# Patient Record
Sex: FEMALE | URBAN - METROPOLITAN AREA
[De-identification: names, ages, dates, MRNs, and addresses within clinical notes are randomized per-mention and may not be internally consistent; named-entity substitution may affect disease eponyms.]

---

## 2021-10-14 ENCOUNTER — OFFICE VISIT (OUTPATIENT)
Dept: ORTHOPEDIC SURGERY | Age: 46
End: 2021-10-14
Payer: COMMERCIAL

## 2021-10-14 DIAGNOSIS — M70.62 GREATER TROCHANTERIC BURSITIS OF BOTH HIPS: ICD-10-CM

## 2021-10-14 DIAGNOSIS — G89.29 CHRONIC BILATERAL LOW BACK PAIN WITHOUT SCIATICA: Primary | ICD-10-CM

## 2021-10-14 DIAGNOSIS — M54.50 CHRONIC BILATERAL LOW BACK PAIN WITHOUT SCIATICA: Primary | ICD-10-CM

## 2021-10-14 DIAGNOSIS — M70.61 GREATER TROCHANTERIC BURSITIS OF BOTH HIPS: ICD-10-CM

## 2021-10-14 PROCEDURE — 99204 OFFICE O/P NEW MOD 45 MIN: CPT | Performed by: PHYSICAL MEDICINE & REHABILITATION

## 2021-10-14 RX ORDER — LEVOTHYROXINE SODIUM 150 UG/1
150 TABLET ORAL
COMMUNITY

## 2021-10-14 RX ORDER — OMEPRAZOLE 20 MG/1
20 CAPSULE, DELAYED RELEASE ORAL DAILY
COMMUNITY

## 2021-10-14 RX ORDER — MELOXICAM 15 MG/1
15 TABLET ORAL
Qty: 30 TABLET | Refills: 0 | Status: SHIPPED | OUTPATIENT
Start: 2021-10-14 | End: 2021-11-13

## 2021-10-14 RX ORDER — METOPROLOL SUCCINATE 50 MG/1
50 TABLET, EXTENDED RELEASE ORAL DAILY
COMMUNITY

## 2021-10-14 NOTE — PROGRESS NOTES
[address]  [address]  [place], [address] [place] [address] Street  Phone: [phone]  Fax: [phone]      Patient: Vincent Celis                                                                              MRN: [id number]        YOB: 1975          AGE: 54 y.o. PCP: Noris Bran MD  Date:  10/14/21    Reason for Consultation: Back Pain      HPI:  Vincent Celis is a 54 y.o. female with relevant PMH of  HTN, GERD  who presents with low back pain radiating down the right leg . Pain began at the age of 11 when she was horseback riding and thrown from a horse. She did not have any imaging or treatment at the time. Three years ago she moved here from [place]. While in [place] she tried an epidural injection, PT and chiropractic treatments. Recently she was seen at [facility] and had x-rays of her lumbar spine with mild DDD at L5-S1. She was referred to PT but was not able to go at that time. Neurologic symptoms: + numbness, tingling- b/l hands. NO weakness, bowel or bladder changes. No recent falls    Numbness b/l hands    Location: The pain is located in the low back , upper back    Radiation: The pain does radiate poterior thigh, intermittent. Pain Score: Currently: 4/10  At Best: 2/10  At Worst: 9/10   Quality: Pain is of a Achy, Dull and sharp quality. Aggravating: Pain is exacerbated by walking and standing  Alleviating:  The pain is alleviated by sitting and lying down    Prior Treatments:   Physical therapy in [place] - traction -made pain worse 2007  Epidural DRISS 2006 3 months relief     Previous Medications:   Current Medications: ibuprofen 800mg    Previous work-up has included:   X-ray lumbar 7/2021  Lumbar spine shows overall normal coronal and sagittal alignment.  Overall   well-maintained disc spaces without significant degenerative disc disease   present except for mild degenerative changes at the L5-S1 level.  Trace   anterolisthesis of L5 on S1.  X-ray thoracic spine 7/2021  Thoracic spine shows mild degenerative disc disease at multiple levels   without significant evidence disc collapse.  No evidence of acute fracture   or dislocation.  Overall normal sagittal and coronal alignment  VERTEBRAE AND ALIGNMENT: Segments are normal in height and alignment. T9 levels and below visualized on the AP view in the mid T7 level below as seen on the lateral view. Past Medical History: No past medical history on file. Past Surgical History: No past surgical history on file. SocHx:   Social History     Tobacco Use    Smoking status: Not on file   Substance Use Topics    Alcohol use: Not on file      FamHx:? No family history on file. Current Medications:    Current Outpatient Medications   Medication Sig Dispense Refill    metoprolol succinate (TOPROL-XL) 50 mg XL tablet Take 50 mg by mouth daily.  levothyroxine (SYNTHROID) 150 mcg tablet Take 150 mcg by mouth Daily (before breakfast).  omeprazole (PRILOSEC) 20 mg capsule Take 20 mg by mouth daily. Allergies: Allergies   Allergen Reactions    Latex Hives    Ambien [Zolpidem] Hives    Iron Hives    Propulsid Hives    Wellbutrin [Bupropion] Hives        Review of Systems:   Gen:    Denied fevers, chills, malaise, fatigue, weight changes   Resp: Denied shortness of breath, cough, wheezing   CVS: Denied chest pain, palpitations   : Denied urinary urgency, frequency, incontinence   GI: Denied nausea, vomiting, constipation, diarrhea   Skin: Denied rashes, wounds   Psych: Denied anxiety, depression   Vasc: Denied claudication, ulcers   Hem: Denied easy bruising/bleeding   MSK: See HPI   Neuro: See HPI         Physical Exam     Vital Signs: There were no vitals taken for this visit. General: ???????  Well nourished and well developed female without any acute distress   Psychiatric: ?  Alert and oriented x 3 with normal mood    HEENT: ???????? Atraumatic   Respiratory:   Breathing non-labored and non dyspneic   CV: ???????????????? Peripheral pulses intact, no peripheral edema   Skin: ????????????? No rashes       Neurologic: ?? Sensation: normal and grossly intact thebilateral, lower extremity(s)   Strength: 5/5 in the bilateral, lower extremity(s)   Reflexes: reveals 2+ symmetric DTRs throughout    Gait: normal       Musculoskeletal: Lumbar Exam     Inspection:   Alignment: Normal b/l genu valgum  Atrophy: None   Single leg stance: Abnormal instability      Tenderness to Palpation:   Lumbar paraspinals Positive L2- SI joints  Lumbar spinous processes Positive  SI Joint:  Positive  Gluteal:Positive   Greater trochanter: Positive     ROM:   Lumbar ROM: Normal  Lumbar facet loading: Negative  Hip ROM: No reproduction of pain with movement     Special Tests      Slump test: Negative  SLR: Negative  MONICA: Positive  Right low back pain  FADIR: Negative  Scour:  Negative  Stinchfield: Negative++ low back pain  Log Roll: Negative  SI joint compression: Negative        Medical Decision Making:    Images: The imaging results as well as the actual images of the studies below were reviewed and visualized. Labs: The results below were reviewed. X-ray lumbar 7/2021  Lumbar spine shows overall normal coronal and sagittal alignment.  Overall   well-maintained disc spaces without significant degenerative disc disease   present except for mild degenerative changes at the L5-S1 level.  Trace   anterolisthesis of L5 on S1.  X-ray thoracic spine 7/2021  Thoracic spine shows mild degenerative disc disease at multiple levels   without significant evidence disc collapse.  No evidence of acute fracture   or dislocation.  Overall normal sagittal and coronal alignment       Assessment:   1. Low back pain- DDD  2. SI joint pain  3.  GT bursitis with gluteal weakness    Plan:      -Physical therapy -encouraged to start PT to work on gluteal strengthening exercises     -Medications - will try short course of meloxicam 15mg with food x 10 days, not to take with other NSAIDS and can then take prnCounseled regarding side effects and appropriate administration of medications.    -Diagnostics/Imaging - reviewed x-ray reports  -Injections - Consider DRISS, vs SI joint injections  -Lifestyle - She has recently lost weight, continue weight loss program, begin regular aerobic and strengthening exercise  -Education - The patient's diagnosis, prognosis and treatment options were discussed today. All questions were answered. F/U - in 8 week(s) or sooner if needed.   Consider MRI lumbar spine         [facility]

## 2021-10-20 ENCOUNTER — HOSPITAL ENCOUNTER (OUTPATIENT)
Dept: PHYSICAL THERAPY | Age: 46
Discharge: HOME | End: 2021-10-20
Attending: PHYSICAL MEDICINE & REHABILITATION
Payer: MEDICARE

## 2021-10-20 PROCEDURE — 97162 PT EVAL MOD COMPLEX 30 MIN: CPT

## 2021-10-20 PROCEDURE — 97110 THERAPEUTIC EXERCISES: CPT

## 2021-10-20 NOTE — PROGRESS NOTES
PHYSICAL THERAPY - DAILY TREATMENT NOTE    Patient Name: Kathy Phoenix        Date: 10/20/2021  : 1975   Yes Patient  Verified  Visit #:     Insurance: Payor: [employer] / Plan: [employer] HMO/CHOICE PLUS/POS / Product Type: HMO /      In time: 12:06 Out time: 12:40   Total Treatment Time: 34     Medicare/[employer] Time Tracking (below)   Total Timed Codes (min):  10 1:1 Treatment Time:  10     TREATMENT AREA =  Other low back pain [M54.59]    SUBJECTIVE  Pain Level (on 0 to 10 scale):  8  / 10   Medication Changes/New allergies or changes in medical history, any new surgeries or procedures?    no  If yes, update Summary List   Subjective Functional Status/Changes:  []  No changes reported     See POC         OBJECTIVE  Modalities Rationale:     10 min Therapeutic Exercise:  [x]  See flow sheet   Rationale:      increase ROM and increase strength to improve the patients ability to tolerate prolonged standing    Billed With/As:   [x] TE   [] TA   [] Neuro   [] Self Care Patient Education: [x] Review HEP    [x] Progressed/Changed HEP based on:   [x] positioning   [x] body mechanics   [] transfers   [] heat/ice application    [] other:      Other Objective/Functional Measures:    See POC     Post Treatment Pain Level (on 0 to 10) scale:   4  / 10     ASSESSMENT  Assessment/Changes in Function:     See POC     []  See Progress Note/Recertification   Patient will continue to benefit from skilled PT services to modify and progress therapeutic interventions, address functional mobility deficits, address ROM deficits, address strength deficits, analyze and address soft tissue restrictions, analyze and cue movement patterns, analyze and modify body mechanics/ergonomics, assess and modify postural abnormalities and instruct in home and community integration to attain remaining goals.    Progress toward goals / Updated goals:    See newly established goals in POC     PLAN  [x] Upgrade activities as tolerated yes Continue plan of care   []  Discharge due to :    []  Other:      Therapist: Josseline Franco    Date: 10/20/2021 Time: 8:16 AM     Future Appointments   Date Time Provider Yfn Archer   10/20/2021 12:00 PM Donnell [facility]   12/16/2021  1:00 PM Amy Live MD [facility] AMB

## 2021-10-20 NOTE — PROGRESS NOTES
[facility] PHYSICAL THERAPY   [facility] [address],Brandi Prasad, [address] - Phone: [phone]  Fax: [phone] OF [facility] / [address] [facility]  Patient Name: Inderjit Christiansen : 1975   Medical   Diagnosis: Other low back pain [M54.59] Treatment Diagnosis: Other low back pain [M54.59]   Onset Date: Chronic      Referral Source: [facility] Start of Care [facility]): 10/20/2021   Prior Hospitalization: See medical history Provider #: [id number]   Prior Level of Function: Chronic h/o LBP with ambulation and standing   Comorbidities: Allergies, Anxiety or Panic Disorders, Arthritis, Asthma, Back pain, BMI > 30, Depression, GI Disease, Headaches, HTN, Previous accidents, Prior Surgery   Medications: Verified on Patient Summary List   The Plan of Care and following information is based on the information from the initial evaluation.   ===========================================================================================  Assessment / key information: Patient is a 54 y.o. female who presents to [facility] at [facility]. with diagnosis of Other low back pain [M54.59]. Patient reports chronic h/o LBP since age 11 s/p falling off a horse. Currently, patient is not working secondary to disability. X-Rays of her lumbar spine showed mild DDD at L5-S1 and trace anterolisthesis of L5 on S1. Notes h/o epidural injection, PT, and chiropractic treatments in order to mange LBP. Denies benefit from prior 3 bouts of PT interventions. Pt states: Da Buchanan said my medical won't cover my MRI till I do PT\". Back pain is described as constant and located on the right side of the back and tailbone and radiates down the right LE to the knee. In addition, patient reports intermittent KATHRYN lateral hip pain with laying in sidelying and walking.  Pt reports intermittent numbness and tingling in KATHRYN LEs with squatting, kneeling, and bending. Pain level is rated at 3/10 at the best, 8/10 currently, and 9/10 at the worst. LBP increases with walking (5 minutes), sitting, and standing, decreases with lying flat or in sidelying. Upon objective evaluation, patient demonstrates grossly impaired and painful trunk AROM (Flexion 85% (R sided tightness) Ext 50% (R LBP), R/L SB 75/65% (LBP), and R/L Rotation 100%), intact KATHRYN LE L2-S1 light touch sensation, decreased core strength (supine bridge = 65%), TTP to KATHRYN GT, increased mm tone noted with palpation to R>L ITB, R piriformis, R QL, R>L hip flexor, and impaired flexibility of KATHRYN hamstring (R/L Ham 90/90 = 142/150 deg) musculature. (-) L Slump, L SLR, KATHRYN SI compression. (+) (R) SLR, R Gillet's, FF SI, L MONICA, (+) Leg length (Right anterior, left posterior). When clearing red flags patient notes numbness and pain in the vaginal area with sciatic nerve pain (denies sx for about a month). Denies changes in B/B, blood in the stool or urine, and unexplained weight changes. Patient scored 50/100 on FOTO indicating decreased functional status and quality of life. Patient can benefit from skilled PT interventions to improve L/S ROM, flexibility, core strength, decrease pain and TTP and for education on posture, body mechanics and lifting mechanics/transfers to facilitate ADL's & overall functional status/quality of life.       L(0-5) R (0-5)   Psoas (L1,2) 4+ 5-   Quadriceps (L3,4) 5- 5-   Ant Tibialis (L4) 4 4+   Gluteus Medius (L5) 4 4   Hamstring (S1,2) 4- 3+   Gluteus Umer (S1, S2) 4- 3+   ===========================================================================================  Eval Complexity: History HIGH Complexity :3+ comorbidities / personal factors will impact the outcome/ POC ;  Examination  HIGH Complexity : 4+ Standardized tests and measures addressing body structure, function, activity limitation and / or participation in recreation ; Presentation MEDIUM Complexity : Evolving with changing characteristics ; Decision Making MEDIUM Complexity : FOTO score of 26-74; Overall Complexity MEDIUM  Problem List: pain affecting function, decrease ROM, decrease strength, edema affecting function, impaired gait/ balance, decrease ADL/ functional abilities, decrease activity tolerance, decrease flexibility/ joint mobility and decrease transfer abilities   Treatment Plan may include any combination of the following: Therapeutic exercise, Therapeutic activities, Neuromuscular re-education, Physical agent/modality, Gait/balance training, Manual therapy, Patient education, Self Care training, Functional mobility training, Home safety training and Stair training  Patient / Family readiness to learn indicated by: asking questions, trying to perform skills and interest  Persons(s) to be included in education: patient (P)  Barriers to Learning/Limitations: None  Measures taken, if barriers to learning:    Patient Goal (s): \"lessen my pain\"   Patient self reported health status: good  Rehabilitation Potential: good   Short Term Goals: To be accomplished in  2  weeks:  1) Establish HEP. 2) Patient will report decreased c/o pain to < or = 8/10 at the worst to facilitate prolonged standing with manageable sx in L/S.   Long Term Goals: To be accomplished in  6  weeks:  1) Patient to be independent & compliant with a progressive, high level HEP in order to maintain gains made in physical therapy. 2) Patient will report decreased c/o pain to < or = 6/10 at the worst to facilitate prolonged standing with manageable sx in L/S.  3) Increase FOTO to 58/100 indicating improved function and quality of life. 4) Patient to perform full pain free bridge for 10 repetitions indicating improved core strength to improve ambulation around the grocery store.    Frequency / Duration:   Patient to be seen  2  times per week for 6  weeks:  Patient / Caregiver education and instruction: self care, activity modification, brace/ splint application and exercises  Therapist Signature: Belen Jean Date: 98/83/5216   Certification Period: 10/20/2021 to 1/19/2022 Time: 11:41 AM   ===========================================================================================  I certify that the above Physical Therapy Services are being furnished while the patient is under my care. I agree with the treatment plan and certify that this therapy is necessary. Physician Signature:        Date:       Time:                                          Alicia Lambert*  Please sign and return to [facility] at [facility]. or you may fax the signed copy to [phone]. Thank you.

## 2021-10-27 ENCOUNTER — HOSPITAL ENCOUNTER (OUTPATIENT)
Dept: PHYSICAL THERAPY | Age: 46
Discharge: HOME | End: 2021-10-27
Attending: PHYSICAL MEDICINE & REHABILITATION
Payer: MEDICARE

## 2021-10-27 PROCEDURE — 97110 THERAPEUTIC EXERCISES: CPT

## 2021-10-27 PROCEDURE — 97530 THERAPEUTIC ACTIVITIES: CPT

## 2021-10-27 PROCEDURE — 97112 NEUROMUSCULAR REEDUCATION: CPT

## 2021-10-27 NOTE — PROGRESS NOTES
PHYSICAL THERAPY - DAILY TREATMENT NOTE    Patient Name: Jose Santana        Date: 10/27/2021  : 1975   yes Patient  Verified  Visit #:      of   12  Insurance: Payor: Ulysses Jackson [place] / Plan: [employer] MCR / Product Type: Managed Care Medicare /      In time: 6968 pm Out time: 115 pm   Total Treatment Time: 46     Medicare/[employer] Time Tracking (below)   Total Timed Codes (min):  46 1:1 Treatment Time:  46     TREATMENT AREA =  Other low back pain [M54.59]    SUBJECTIVE  Pain Level (on 0 to 10 scale):  3 / 10   Medication Changes/New allergies or changes in medical history, any new surgeries or procedures?    no  If yes, update Summary List   Subjective Functional Status/Changes:  []  No changes reported     Pt reports no significant changes since IE. OBJECTIVE  17 min Therapeutic Exercise:  [x]  See flow sheet   Rationale:      increase ROM and increase strength to improve the patient's ability to perform LE functional tasks and ADLs     5 min Manual Therapy: STM/MFR R piriformis   Rationale:      decrease pain, increase ROM, increase tissue extensibility and decrease trigger points to improve patient's ability to perform LE functional tasks and ADLs  The manual therapy interventions were performed at a separate and distinct time from the therapeutic activities interventions.     8 min Neuromuscular Re-ed: [x]  See flow sheet   Rationale:    increase strength and improve coordination to improve the patient's ability to perform LE functional tasks and ADLs    8 min Therapeutic Activity: [x]  See flow sheet   Rationale:    increase strength, improve coordination and increase proprioception to improve the patient's ability to perform functional tasks and ADLs    8  NC min Self Care: See pt education   Rationale:    Pt education to improve the patient's ability to perform exercises, adhere to PT POC    Billed With/As:  [] TE  [] TA  [] Neuro  [x] Self Care Patient Education: [x] Review HEP    [] Progressed/Changed HEP based on:   [x] positioning   [x] body mechanics   [] transfers   [] heat/ice application    [] other:     Other Objective/Functional Measures:    Performed exercises per flow sheet     Post Treatment Pain Level (on 0 to 10) scale:   6  / 10     ASSESSMENT  Assessment/Changes in Function:     Inc in symptoms reported with NuStep, will consider other w/u machine or activity in subsequent visits. Cues given for all new exercises to ensure proper form. Will progress as maribeth. []  See Progress Note/Recertification   Patient will continue to benefit from skilled PT services to modify and progress therapeutic interventions, address functional mobility deficits, address ROM deficits, address strength deficits, analyze and address soft tissue restrictions, analyze and cue movement patterns, analyze and modify body mechanics/ergonomics, assess and modify postural abnormalities, address imbalance/dizziness and instruct in home and community integration to attain remaining goals.    Progress toward goals / Updated goals:    First f/u since POC     PLAN  [x]  Upgrade activities as tolerated yes Continue plan of care   []  Discharge due to :    []  Other:      Therapist: Milford Kawasaki, PT    Date: 10/27/2021 Time: 12:15 PM     Future Appointments   Date Time Provider Yfn Archer   11/10/2021  1:15 PM [address] [facility]   11/17/2021  1:15 PM [address] [facility]   11/19/2021  1:15 PM [address] [facility]   11/24/2021  2:15 PM Crystal Levine PT [facility]   12/1/2021  2:15 PM Maria Del Rosario Hunt [facility]   12/3/2021  1:30 PM Moose Staff [facility]   12/8/2021  2:15 PM Moose Staff [facility]   12/16/2021  1:00 PM Cici Brown MD [facility]

## 2021-10-28 ENCOUNTER — APPOINTMENT (OUTPATIENT)
Dept: PHYSICAL THERAPY | Age: 46
End: 2021-10-28
Attending: PHYSICAL MEDICINE & REHABILITATION
Payer: MEDICARE

## 2021-11-10 ENCOUNTER — HOSPITAL ENCOUNTER (OUTPATIENT)
Dept: PHYSICAL THERAPY | Age: 46
Discharge: HOME | End: 2021-11-10
Attending: PHYSICAL MEDICINE & REHABILITATION
Payer: MEDICARE

## 2021-11-10 PROCEDURE — 97110 THERAPEUTIC EXERCISES: CPT

## 2021-11-10 PROCEDURE — 97112 NEUROMUSCULAR REEDUCATION: CPT

## 2021-11-10 PROCEDURE — 97530 THERAPEUTIC ACTIVITIES: CPT

## 2021-11-10 NOTE — PROGRESS NOTES
PHYSICAL THERAPY - DAILY TREATMENT NOTE    Patient Name: Robb Zavala        Date: 11/10/2021  : 1975   yes Patient  Verified  Visit #:   3   of   12  Insurance: Payor: Ulysses Jackson [place] / Plan: [employer] MCR / Product Type: Managed Care Medicare /      In time: 115 Out time: 200   Total Treatment Time: 45     Medicare/[employer] Time Tracking (below)   Total Timed Codes (min):  45 1:1 Treatment Time:  45     TREATMENT AREA =  Other low back pain [M54.59]    SUBJECTIVE  Pain Level (on 0 to 10 scale):  3 / 10   Medication Changes/New allergies or changes in medical history, any new surgeries or procedures?    no  If yes, update Summary List   Subjective Functional Status/Changes:  []  No changes reported     Pt reports no significant changes since last visit. Sometimes she has pain in the RLE, but \"it comes and goes\". Today she has midline LBP. OBJECTIVE  15 min Therapeutic Exercise:  [x]  See flow sheet   Rationale:      increase ROM and increase strength to improve the patient's ability to perform LE functional tasks and ADLs     5 NC min Manual Therapy: STM/MFR R piriformis   Rationale:      decrease pain, increase ROM, increase tissue extensibility and decrease trigger points to improve patient's ability to perform LE functional tasks and ADLs  The manual therapy interventions were performed at a separate and distinct time from the therapeutic activities interventions.     10 min Neuromuscular Re-ed: [x]  See flow sheet   Rationale:    increase strength and improve coordination to improve the patient's ability to perform LE functional tasks and ADLs    10 min Therapeutic Activity: [x]  See flow sheet   Rationale:    increase strength, improve coordination and increase proprioception to improve the patient's ability to perform functional tasks and ADLs    5  NC min Self Care: See pt education   Rationale:    Pt education to improve the patient's ability to perform exercises, adhere to PT POC    Billed With/As:  [] TE  [] TA  [] Neuro  [x] Self Care Patient Education: [x] Review HEP    [] Progressed/Changed HEP based on:   [x] positioning   [x] body mechanics   [] transfers   [] heat/ice application    [] other:     Other Objective/Functional Measures:    Performed exercises per flow sheet     Post Treatment Pain Level (on 0 to 10) scale:   2 / 10     ASSESSMENT  Assessment/Changes in Function:   Held nu-step 2/2 patient request as it hurt her L-S and hips last visit. Will consider alternate form of cardio NV pending pt tolerance. []  See Progress Note/Recertification   Patient will continue to benefit from skilled PT services to modify and progress therapeutic interventions, address functional mobility deficits, address ROM deficits, address strength deficits, analyze and address soft tissue restrictions, analyze and cue movement patterns, analyze and modify body mechanics/ergonomics, assess and modify postural abnormalities, address imbalance/dizziness and instruct in home and community integration to attain remaining goals.    Progress toward goals / Updated goals:    Progressing slowly to STG #1     PLAN  [x]  Upgrade activities as tolerated yes Continue plan of care   []  Discharge due to :    []  Other:      Therapist: Rene Coleman PT    Date: 11/10/2021 Time: 12:15 PM     Future Appointments   Date Time Provider Yfn Archer   11/17/2021  1:15 PM [address] [facility]   11/19/2021  1:15 PM [address] [facility]   11/24/2021  2:15 PM Julio López PT [facility]   12/1/2021  2:15 PM [facility] [facility]   12/3/2021  [facility]   12/8/2021  2:15 PM [facility]   12/16/2021  1:00 PM Merlinda Mouton, Anna-Christina, MD [facility] AMB

## 2021-11-17 ENCOUNTER — HOSPITAL ENCOUNTER (OUTPATIENT)
Dept: PHYSICAL THERAPY | Age: 46
Discharge: HOME | End: 2021-11-17
Attending: PHYSICAL MEDICINE & REHABILITATION
Payer: MEDICARE

## 2021-11-17 PROCEDURE — 97110 THERAPEUTIC EXERCISES: CPT

## 2021-11-17 PROCEDURE — 97112 NEUROMUSCULAR REEDUCATION: CPT

## 2021-11-17 PROCEDURE — 97530 THERAPEUTIC ACTIVITIES: CPT

## 2021-11-17 NOTE — PROGRESS NOTES
PHYSICAL THERAPY - DAILY TREATMENT NOTE    Patient Name: Otis Ramos        Date: 2021  : 1975   yes Patient  Verified  Visit #:      12  Insurance: Payor: Ulysses Jackson Ciara / Plan: 72 el? MCR / Product Type: Managed Care Medicare /      In time: 115 Out time: 200   Total Treatment Time: 45     Medicare/BCBS Time Tracking (below)   Total Timed Codes (min):  45 1:1 Treatment Time:  45     TREATMENT AREA =  Other low back pain [M54.59]    SUBJECTIVE  Pain Level (on 0 to 10 scale):  2 / 10   Medication Changes/New allergies or changes in medical history, any new surgeries or procedures?    no  If yes, update Summary List   Subjective Functional Status/Changes:  []  No changes reported     \"I still cant lay on either side without pain. No radicular s/s. Pain is localized to the midline L-S.\"       OBJECTIVE  15 min Therapeutic Exercise:  [x]  See flow sheet   Rationale:      increase ROM and increase strength to improve the patient's ability to perform LE functional tasks and ADLs     5 NC min Manual Therapy: STM/MFR R piriformis   Rationale:      decrease pain, increase ROM, increase tissue extensibility and decrease trigger points to improve patient's ability to perform LE functional tasks and ADLs  The manual therapy interventions were performed at a separate and distinct time from the therapeutic activities interventions. 15 min Neuromuscular Re-ed: [x]  See flow sheet; Kinesiotape- space correction and star technique applied to the SI joint . Patient was educated on Kinesiotape precautions, indications, contraindications, and instructed to remove if irritation/rash/redness/pain increases or develops. Patient with verbalized understanding of all.      Rationale:    increase strength and improve coordination to improve the patient's ability to perform LE functional tasks and ADLs    10 min Therapeutic Activity: [x]  See flow sheet   Rationale:    increase strength, improve coordination and increase proprioception to improve the patient's ability to perform functional tasks and ADLs    Billed With/As:  [] TE  [x] TA  [] Neuro  [] Self Care Patient Education: [x] Review HEP    [] Progressed/Changed HEP based on:   [x] positioning   [x] body mechanics   [] transfers   [] heat/ice application    [] other:     Other Objective/Functional Measures: Added BKFO, clam shells   Performed exercises per flow sheet  FOTO 44/58  B hip extension 3+/5     Post Treatment Pain Level (on 0 to 10) scale:   2 / 10     ASSESSMENT  Assessment/Changes in Function:   No exacerbation of s/s with today's session. []  See Progress Note/Recertification   Patient will continue to benefit from skilled PT services to modify and progress therapeutic interventions, address functional mobility deficits, address ROM deficits, address strength deficits, analyze and address soft tissue restrictions, analyze and cue movement patterns, analyze and modify body mechanics/ergonomics, assess and modify postural abnormalities, address imbalance/dizziness and instruct in home and community integration to attain remaining goals. Progress toward goals / Updated goals: · Short Term Goals: To be accomplished in  2  weeks:  1) Establish HEP. -ongoing 11/17/21    2) Patient will report decreased c/o pain to < or = 8/10 at the worst to facilitate prolonged standing with manageable sx in L/S.-progressing currently 2/10 VAS 11/17/21    · Long Term Goals:  To be accomplished in  6  weeks:  1) Patient to be independent & compliant with a progressive, high level HEP in order to maintain gains made in physical therapy.-ongoing 11/17/21  2) Patient will report decreased c/o pain to < or = 6/10 at the worst to facilitate prolonged standing with manageable sx in L/S.-PROGRESSING-continues to have pain with prolonged standing and walking> 1 hour 11/17/21  3) Increase FOTO to 58/100 indicating improved function and quality of life.-NOT MET 44/58  4) Patient to perform full pain free bridge for 10 repetitions indicating improved core strength to improve ambulation around the grocery store. -MET 11/17/21       PLAN  [x]  Upgrade activities as tolerated yes Continue plan of care   []  Discharge due to :    [x]  Other: recert NV  Add goals: ambulate in [employer] 1 hour without LBP >/= 4/10VAS  Be able to roll in bed without hip pain for improved bed mobility     Therapist: Edison Nickerson PT    Date: 11/17/2021 Time: 12:15 PM     Future Appointments   Date Time Provider Yfn Archer   11/19/2021  1:15 PM [address] [facility]   11/24/2021  2:15 PM Colette Carroll PT [facility]   12/1/2021  2:15 PM Carroll [place] [facility]   12/3/2021  1:30 PM Carroll [place] [facility]   12/8/2021  2:15 PM Snehal Coil [facility]   12/16/2021  1:00 PM Cici Gallardo MD [facility]

## 2021-11-19 ENCOUNTER — APPOINTMENT (OUTPATIENT)
Dept: PHYSICAL THERAPY | Age: 46
End: 2021-11-19
Attending: PHYSICAL MEDICINE & REHABILITATION
Payer: MEDICARE

## 2021-11-24 ENCOUNTER — HOSPITAL ENCOUNTER (OUTPATIENT)
Dept: PHYSICAL THERAPY | Age: 46
End: 2021-11-24
Attending: PHYSICAL MEDICINE & REHABILITATION
Payer: MEDICARE

## 2021-12-01 ENCOUNTER — HOSPITAL ENCOUNTER (OUTPATIENT)
Dept: PHYSICAL THERAPY | Age: 46
Discharge: HOME | End: 2021-12-01
Attending: PHYSICAL MEDICINE & REHABILITATION
Payer: MEDICARE

## 2021-12-01 PROCEDURE — 97535 SELF CARE MNGMENT TRAINING: CPT

## 2021-12-01 PROCEDURE — 97110 THERAPEUTIC EXERCISES: CPT

## 2021-12-01 NOTE — PROGRESS NOTES
[address] [facility] THERAPY   [facility] [address], [facility] [address],[facility], [address] [facility] - Phone: [phone]  Fax: [phone] FOR PHYSICAL THERAPY              Patient Name: Cat Harris : 1975   Treatment/Medical Diagnosis: Other low back pain [M54.59]   Onset Date: chronic    Referral Source: [facility]): 10/20/21   Prior Hospitalization: See Medical History Provider #: [id number]   Prior Level of Function: Chronic h/o LBP with ambulation and standing   Comorbidities: Allergies, Anxiety or Panic Disorders, Arthritis, Asthma, Back pain, BMI > 30, Depression, GI Disease, Headaches, HTN, Previous accidents, Prior Surgery   Medications: Verified on Patient Summary List   Visits from [facility]: 5 Missed Visits: 0     Goal/Measure of Progress Goal Met? 1. Patient to be independent & compliant with a progressive, high level HEP in order to maintain gains made in physical therapy   Status at Last Eval: HEP issued Current Status: I and compliant continuing   2. Patient will report decreased c/o pain to < or = 6/10 at the worst to facilitate prolonged standing with manageable sx in L/S.-   Status at Last Eval: 9/10 Current Status: 8/10 no   3. Increase FOTO to 58/100 indicating improved function and quality of life   Status at Last Eval: 50/100 Current Status: 44/100 regression   4. Patient to perform full pain free bridge for 10 repetitions indicating improved core strength to improve ambulation around the grocery store. Status at Last Eval: unable Current Status: able yes       Key Functional Changes/Progress: Patient attended 5 PT sessions, including an initial evaluation. Patient reports 10% overall improvement since beginning PT. In the last 2 weeks, pain has ranged between 3-8/10 with an average daily pain 3/10.  Low back pain and L hip pain is described as a constant aching, however L hip pain will worsen when moving it in any direction. Notes elevated pain when sleeping on the L side and walking >15 minutes. Able to alleviate symptoms with ibuprofen, heat to the low back, and ice to the L hip. Patient is self discharging from physical therapy because she will be moving back home to [place]. Patient was issued a long-term HEP as well as discharge instructions on activity pacing and use of modalities to self manage symptoms. Thank you for this referral.     Assessments/Recommendations: Other: Patient self discharge. Patient will be moving back home to [place]. If you have any questions/comments please contact us directly at [phone]. Thank you for allowing us to assist in the care of your patient. Therapist Signature: GRACIA Pickard; Val Arreguin PT, DPT   Date: 12/01/21   Reporting Period: 10/20/21 to 12/01/21 Time: 5:14 PM     NOTE TO PHYSICIAN:  Your patient's insurance requires this discharge note be signed and returned. PLEASE COMPLETE THE ORDERS BELOW AND RETURN TO:  [facility] PHYSICAL THERAPY    ___ I have read the above report and request that my patient be discharged from therapy.      Physician Signature:        Date:       Time:                                        Yaneth Baptiste

## 2021-12-01 NOTE — PROGRESS NOTES
PHYSICAL THERAPY - DAILY TREATMENT NOTE    Patient Name: Martinez Arroyo        Date: 2021  : 1975   yes Patient  Verified  Visit #:      12  Insurance: Payor: Sheldon Almendarezuser / Plan: [employer] MCR / Product Type: Managed Care Medicare /      In time: 2:15 Out time: 3:00   Total Treatment Time: 45     Medicare/BCBS Time Tracking (below)   Total Timed Codes (min):  35 1:1 Treatment Time:  35     TREATMENT AREA =  Other low back pain [M54.59]    SUBJECTIVE  Pain Level (on 0 to 10 scale):  6 / 10   Medication Changes/New allergies or changes in medical history, any new surgeries or procedures?    no  If yes, update Summary List   Subjective Functional Status/Changes:  []  No changes reported     Patient reports that she had a really bad allergic reaction to the kinesiotape and having increase upper back pain after LV. States that she immediately removed it and felt better. Pt also reports that today will be her last day because she is moving back to [place] with her family. SEE DC       OBJECTIVE  15 min Therapeutic Exercise:  [x]  See flow sheet   Rationale:      increase ROM and increase strength to improve the patient's ability to perform LE functional tasks and ADLs       20 min Self Care: Reassessment. Issued and reviewed updated HEP. Rationale:  to improve understanding of current diagnosis with realistic expectation of PT to improve compliance/adherence and satisfaction    Billed With/As:   [x] TE   [] TA   [] Neuro   [] Self Care Patient Education: [x] Review HEP:   Partnered Access Code Date Issued   [facility] 21     [x] Progressed/Changed HEP based on:   [] Addressed barriers and behaviors     [] Therapeutic Neuroscience Pain Education, metaphor, reframing, contexts.     [] Sleep Education   [] Body Mechanics [] Healing Timeframe     [] Self STM with ball at \"the spot\"  [] Walking Program/Global Activity   [] other:        Other Objective/Functional Measures:    Access Code: [email or web address]  URL: [email or web address]  Date: 12/01/2021  Prepared by: Almita Chappell    Program Notes  PERFORM ALL EXERCISES TO YOUR TOLERANCE. IF SHARP PAIN OR RED FLAGS OCCUR, IMMEDIATELY STOP EXERCISE. Exercises  Seated Piriformis Stretch - 2-3 x daily - 3-5 reps - 20-30 hold  Seated Hamstring Stretch - 2-3 x daily - 3-5 reps - 20-30 hold  Standing Hip Flexor Stretch with Chair - 2-3 x daily - 3-5 reps - 20-30 hold  Gastroc Stretch with Foot at Wall - 2-3 x daily - 3-5 reps - 20-30 hold  Supine Sciatic Nerve Glide - 2 x daily - 7 x weekly - 2 sets - 10 reps - 5 hold    SEE DC     Post Treatment Pain Level (on 0 to 10) scale:   4 / 10     ASSESSMENT  Assessment/Changes in Function:     SEE DC     []  See Progress Note/Recertification   Patient will continue to benefit from skilled PT services to modify and progress therapeutic interventions, address functional mobility deficits, address ROM deficits, address strength deficits, analyze and address soft tissue restrictions, analyze and cue movement patterns, analyze and modify body mechanics/ergonomics, assess and modify postural abnormalities, address imbalance/dizziness and instruct in home and community integration to attain remaining goals.    Progress toward goals / Updated goals:    SEE DC       PLAN  []  Upgrade activities as tolerated NO Continue plan of care   [x]  Discharge due to : Moving out of town.   []  Other:      Therapist: GRACIA Espinosa    Date: 12/1/2021 Time: 3:31 PM     Future Appointments   Date Time Provider Yfn Archer   12/1/2021  2:15 PM Yosvany [facility]   12/3/2021  1:30 PM Yosvany [facility]   12/8/2021  2:15 PM Yosvany [place]   12/16/2021  1:00 PM Kiley Messer MD [facility]

## 2021-12-03 ENCOUNTER — APPOINTMENT (OUTPATIENT)
Dept: PHYSICAL THERAPY | Age: 46
End: 2021-12-03
Attending: PHYSICAL MEDICINE & REHABILITATION
Payer: MEDICARE

## 2021-12-08 ENCOUNTER — APPOINTMENT (OUTPATIENT)
Dept: PHYSICAL THERAPY | Age: 46
End: 2021-12-08
Attending: PHYSICAL MEDICINE & REHABILITATION
Payer: MEDICARE